# Patient Record
Sex: FEMALE | URBAN - NONMETROPOLITAN AREA
[De-identification: names, ages, dates, MRNs, and addresses within clinical notes are randomized per-mention and may not be internally consistent; named-entity substitution may affect disease eponyms.]

---

## 2017-07-27 ENCOUNTER — APPOINTMENT (OUTPATIENT)
Dept: URBAN - NONMETROPOLITAN AREA CLINIC 44 | Age: 42
Setting detail: DERMATOLOGY
End: 2017-07-27

## 2017-07-27 ENCOUNTER — RX ONLY (RX ONLY)
Age: 42
End: 2017-07-27

## 2017-07-27 DIAGNOSIS — D22 MELANOCYTIC NEVI: ICD-10-CM

## 2017-07-27 DIAGNOSIS — L81.4 OTHER MELANIN HYPERPIGMENTATION: ICD-10-CM

## 2017-07-27 DIAGNOSIS — L259 CONTACT DERMATITIS AND OTHER ECZEMA, UNSPECIFIED CAUSE: ICD-10-CM

## 2017-07-27 PROBLEM — L30.8 OTHER SPECIFIED DERMATITIS: Status: ACTIVE | Noted: 2017-07-27

## 2017-07-27 PROBLEM — D22.5 MELANOCYTIC NEVI OF TRUNK: Status: ACTIVE | Noted: 2017-07-27

## 2017-07-27 PROCEDURE — OTHER TREATMENT REGIMEN: OTHER

## 2017-07-27 PROCEDURE — OTHER COUNSELING: OTHER

## 2017-07-27 PROCEDURE — 99203 OFFICE O/P NEW LOW 30 MIN: CPT

## 2017-07-27 PROCEDURE — OTHER MIPS QUALITY: OTHER

## 2017-07-27 PROCEDURE — OTHER PRESCRIPTION: OTHER

## 2017-07-27 RX ORDER — BETAMETHASONE DIPROPIONATE 0.5 MG/G
OINTMENT TOPICAL
Qty: 1 | Refills: 2 | Status: ERX

## 2017-07-27 ASSESSMENT — LOCATION DETAILED DESCRIPTION DERM
LOCATION DETAILED: RIGHT DISTAL DORSAL FOREARM
LOCATION DETAILED: LEFT MID-UPPER BACK
LOCATION DETAILED: LEFT MID DORSAL INDEX FINGER

## 2017-07-27 ASSESSMENT — LOCATION SIMPLE DESCRIPTION DERM
LOCATION SIMPLE: RIGHT FOREARM
LOCATION SIMPLE: LEFT UPPER BACK
LOCATION SIMPLE: LEFT INDEX FINGER

## 2017-07-27 ASSESSMENT — LOCATION ZONE DERM
LOCATION ZONE: FINGER
LOCATION ZONE: ARM
LOCATION ZONE: TRUNK

## 2017-07-27 NOTE — PROCEDURE: TREATMENT REGIMEN
Detail Level: Simple
Plan: Patient had used clotrimazole bid x2 weeks with no improvement. Also Triamcinolone cream with no improvement
Initiate Treatment: Betamethasone ointment bid prn to affected areas x2 weeks then take one week off \\nAdvised her if not improved in one week then she can occlude for 2-3 days

## 2017-08-30 ENCOUNTER — APPOINTMENT (OUTPATIENT)
Dept: URBAN - NONMETROPOLITAN AREA CLINIC 44 | Age: 42
Setting detail: DERMATOLOGY
End: 2017-08-30

## 2017-08-30 DIAGNOSIS — L259 CONTACT DERMATITIS AND OTHER ECZEMA, UNSPECIFIED CAUSE: ICD-10-CM

## 2017-08-30 PROBLEM — L30.8 OTHER SPECIFIED DERMATITIS: Status: ACTIVE | Noted: 2017-08-30

## 2017-08-30 PROCEDURE — 99212 OFFICE O/P EST SF 10 MIN: CPT

## 2017-08-30 PROCEDURE — OTHER TREATMENT REGIMEN: OTHER

## 2017-08-30 PROCEDURE — OTHER MIPS QUALITY: OTHER

## 2017-08-30 ASSESSMENT — LOCATION ZONE DERM: LOCATION ZONE: FINGER

## 2017-08-30 ASSESSMENT — LOCATION DETAILED DESCRIPTION DERM: LOCATION DETAILED: LEFT MID DORSAL INDEX FINGER

## 2017-08-30 ASSESSMENT — LOCATION SIMPLE DESCRIPTION DERM: LOCATION SIMPLE: LEFT INDEX FINGER

## 2017-08-30 NOTE — PROCEDURE: TREATMENT REGIMEN
Continue Regimen: Betamethasone ointment bid prn to affected areas x2 weeks then take one week off or apply bid prn Monday through Friday and take weekends off.
Detail Level: Simple
Plan: Patient had used clotrimazole bid x2 weeks with no improvement. Also Triamcinolone cream with no improvement
Initiate Treatment: Lac-hydrin daily and on weekends

## 2018-01-03 ENCOUNTER — APPOINTMENT (OUTPATIENT)
Dept: URBAN - NONMETROPOLITAN AREA CLINIC 44 | Age: 43
Setting detail: DERMATOLOGY
End: 2018-01-03

## 2018-05-17 ENCOUNTER — APPOINTMENT (OUTPATIENT)
Dept: URBAN - NONMETROPOLITAN AREA CLINIC 44 | Age: 43
Setting detail: DERMATOLOGY
End: 2018-05-17

## 2018-05-17 DIAGNOSIS — L259 CONTACT DERMATITIS AND OTHER ECZEMA, UNSPECIFIED CAUSE: ICD-10-CM

## 2018-05-17 DIAGNOSIS — L72.8 OTHER FOLLICULAR CYSTS OF THE SKIN AND SUBCUTANEOUS TISSUE: ICD-10-CM

## 2018-05-17 DIAGNOSIS — L81.4 OTHER MELANIN HYPERPIGMENTATION: ICD-10-CM

## 2018-05-17 PROBLEM — L30.8 OTHER SPECIFIED DERMATITIS: Status: ACTIVE | Noted: 2018-05-17

## 2018-05-17 PROCEDURE — 99212 OFFICE O/P EST SF 10 MIN: CPT

## 2018-05-17 PROCEDURE — OTHER TREATMENT REGIMEN: OTHER

## 2018-05-17 PROCEDURE — 87220 TISSUE EXAM FOR FUNGI: CPT

## 2018-05-17 PROCEDURE — OTHER COUNSELING: OTHER

## 2018-05-17 PROCEDURE — OTHER KOH PREP: OTHER

## 2018-05-17 PROCEDURE — OTHER MIPS QUALITY: OTHER

## 2018-05-17 ASSESSMENT — LOCATION SIMPLE DESCRIPTION DERM
LOCATION SIMPLE: LEFT INDEX FINGER
LOCATION SIMPLE: RIGHT UPPER BACK
LOCATION SIMPLE: LEFT UPPER BACK

## 2018-05-17 ASSESSMENT — LOCATION DETAILED DESCRIPTION DERM
LOCATION DETAILED: LEFT MID DORSAL INDEX FINGER
LOCATION DETAILED: LEFT SUPERIOR MEDIAL UPPER BACK
LOCATION DETAILED: RIGHT SUPERIOR UPPER BACK

## 2018-05-17 ASSESSMENT — LOCATION ZONE DERM
LOCATION ZONE: FINGER
LOCATION ZONE: TRUNK

## 2018-05-17 NOTE — PROCEDURE: KOH PREP
Detail Level: Zone
Koh Intro Text (From The.....): A KOH prep was ordered and evaluated from the
Koh Procedure Text (Tissue Harvesting Technique): A 15-blade scalpel was used to scrape the skin. The skin scrapings were placed on a glass slide, covered with a coverslip and a KOH solution was applied.
Showing: no pathologic findings

## 2018-05-17 NOTE — PROCEDURE: TREATMENT REGIMEN
Plan: Patient had used clotrimazole bid x2 weeks with no improvement. Also Triamcinolone cream with no improvement
Continue Regimen: Betamethasone ointment bid prn (wrap with plastic wrap) to affected areas x2 weeks then take one week off or apply bid prn Monday through Friday and take weekends off.
Detail Level: Simple

## 2018-05-29 ENCOUNTER — RX ONLY (RX ONLY)
Age: 43
End: 2018-05-29

## 2018-05-29 ENCOUNTER — APPOINTMENT (OUTPATIENT)
Dept: URBAN - NONMETROPOLITAN AREA CLINIC 44 | Age: 43
Setting detail: DERMATOLOGY
End: 2018-05-29

## 2018-05-29 DIAGNOSIS — L259 CONTACT DERMATITIS AND OTHER ECZEMA, UNSPECIFIED CAUSE: ICD-10-CM

## 2018-05-29 PROBLEM — L30.9 DERMATITIS, UNSPECIFIED: Status: ACTIVE | Noted: 2018-05-29

## 2018-05-29 PROCEDURE — OTHER TREATMENT REGIMEN: OTHER

## 2018-05-29 PROCEDURE — 87220 TISSUE EXAM FOR FUNGI: CPT

## 2018-05-29 PROCEDURE — OTHER PRESCRIPTION: OTHER

## 2018-05-29 PROCEDURE — OTHER KOH PREP: OTHER

## 2018-05-29 PROCEDURE — 99212 OFFICE O/P EST SF 10 MIN: CPT

## 2018-05-29 RX ORDER — CLOBETASOL PROPIONATE 0.5 MG/G
OINTMENT TOPICAL
Qty: 1 | Refills: 1 | Status: ERX

## 2018-05-29 ASSESSMENT — LOCATION DETAILED DESCRIPTION DERM: LOCATION DETAILED: LEFT MID DORSAL INDEX FINGER

## 2018-05-29 ASSESSMENT — LOCATION SIMPLE DESCRIPTION DERM: LOCATION SIMPLE: LEFT INDEX FINGER

## 2018-05-29 ASSESSMENT — LOCATION ZONE DERM: LOCATION ZONE: FINGER

## 2018-05-29 NOTE — PROCEDURE: TREATMENT REGIMEN
Detail Level: Detailed
Plan: NBUVB in reserve
Discontinue Regimen: Betamethasone ointment
Initiate Treatment: Clobetasol ointment bid to affected area for two weeks. Advised her to wait one week before starting.
Detail Level: Simple
Initiate Treatment: Clobetasol
Plan: NB UVB in reserve

## 2018-07-05 ENCOUNTER — APPOINTMENT (OUTPATIENT)
Dept: URBAN - NONMETROPOLITAN AREA CLINIC 44 | Age: 43
Setting detail: DERMATOLOGY
End: 2018-07-05

## 2018-07-05 DIAGNOSIS — L259 CONTACT DERMATITIS AND OTHER ECZEMA, UNSPECIFIED CAUSE: ICD-10-CM

## 2018-07-05 DIAGNOSIS — B35.4 TINEA CORPORIS: ICD-10-CM

## 2018-07-05 PROBLEM — L30.9 DERMATITIS, UNSPECIFIED: Status: ACTIVE | Noted: 2018-07-05

## 2018-07-05 PROBLEM — L08.9 LOCAL INFECTION OF THE SKIN AND SUBCUTANEOUS TISSUE, UNSPECIFIED: Status: ACTIVE | Noted: 2018-07-05

## 2018-07-05 PROCEDURE — OTHER TREATMENT REGIMEN: OTHER

## 2018-07-05 PROCEDURE — 99212 OFFICE O/P EST SF 10 MIN: CPT

## 2018-07-05 ASSESSMENT — LOCATION ZONE DERM: LOCATION ZONE: FINGER

## 2018-07-05 ASSESSMENT — LOCATION SIMPLE DESCRIPTION DERM: LOCATION SIMPLE: LEFT INDEX FINGER

## 2018-07-05 ASSESSMENT — LOCATION DETAILED DESCRIPTION DERM: LOCATION DETAILED: LEFT MID DORSAL INDEX FINGER

## 2018-07-05 NOTE — PROCEDURE: TREATMENT REGIMEN
Detail Level: Simple
Initiate Treatment: Clotrimazole bid x2 week to area scaling around nails.
Plan: Nothing to KOH today
Continue Regimen: Mupirocin bid for 2 weeks
Plan: ATB in reserve, will hold due to the patient just having multiple coarse of ATBs

## 2019-04-12 NOTE — PROCEDURE: KOH PREP
Koh Procedure Text (Tissue Harvesting Technique): A 15-blade scalpel was used to scrape the skin. The skin scrapings were placed on a glass slide, covered with a coverslip and a KOH solution was applied.
Showing: no pathologic findings
Detail Level: Zone
[No Acute Distress] : no acute distress
Koh Intro Text (From The.....): A KOH prep was ordered and evaluated from the
[Well Developed] : well developed
[Well Nourished] : well nourished
[Well-Appearing] : well-appearing
[Normal Sclera/Conjunctiva] : normal sclera/conjunctiva
[EOMI] : extraocular movements intact
[PERRL] : pupils equal round and reactive to light
[Normal Outer Ear/Nose] : the outer ears and nose were normal in appearance
[Normal Oropharynx] : the oropharynx was normal
[No JVD] : no jugular venous distention
[Supple] : supple
[No Lymphadenopathy] : no lymphadenopathy
[No Respiratory Distress] : no respiratory distress 
[Thyroid Normal, No Nodules] : the thyroid was normal and there were no nodules present
[No Accessory Muscle Use] : no accessory muscle use
[Clear to Auscultation] : lungs were clear to auscultation bilaterally
[Normal Rate] : normal rate 
[Regular Rhythm] : with a regular rhythm
[Normal S1, S2] : normal S1 and S2
[I] : a grade 1
[No Carotid Bruits] : no carotid bruits
[No Abdominal Bruit] : a ~M bruit was not heard ~T in the abdomen
[Pedal Pulses Present] : the pedal pulses are present
[No Varicosities] : no varicosities
[No Extremity Clubbing/Cyanosis] : no extremity clubbing/cyanosis
[No Edema] : there was no peripheral edema
[No Palpable Aorta] : no palpable aorta
[Declined Breast Exam] : declined breast exam 
[Soft] : abdomen soft
[Non-distended] : non-distended
[Non Tender] : non-tender
[Normal Bowel Sounds] : normal bowel sounds
[No Masses] : no abdominal mass palpated
[No HSM] : no HSM
[Normal Posterior Cervical Nodes] : no posterior cervical lymphadenopathy
[Declined Rectal Exam] : declined rectal exam
[Normal Anterior Cervical Nodes] : no anterior cervical lymphadenopathy
[No CVA Tenderness] : no CVA  tenderness
[No Joint Swelling] : no joint swelling
[No Spinal Tenderness] : no spinal tenderness
[No Rash] : no rash
[Grossly Normal Strength/Tone] : grossly normal strength/tone
[No Focal Deficits] : no focal deficits
[Normal Gait] : normal gait
[Coordination Grossly Intact] : coordination grossly intact
[Deep Tendon Reflexes (DTR)] : deep tendon reflexes were 2+ and symmetric
[Normal Affect] : the affect was normal
[Normal Insight/Judgement] : insight and judgment were intact